# Patient Record
(demographics unavailable — no encounter records)

---

## 2025-02-14 NOTE — ASSESSMENT
[FreeTextEntry1] : 81 year old male PMHx DM2, HTN, DLD, BPH for follow up. He follows urology for his left flank pain.  - a1c 9.4% (compared to 9.1% in 11/23), - repeat A1c 9 - C/W Lantus 22 units at bedtime and metformin 1000 BID - Ophthalmology referral sent  - Podiatry referral sent   #HTN  - /82  - c/w losartan 25mg OD   # BPH  # hydrocele  - on flomax  #hand pain # shoulder pain, decreased ROM   - X-ray: Erosive, degenerative change in both hands consistent with underlying inflammatory arthropathy - RF negative - Likely OA pt worked as a  for many years - OTC Tylenol - Diclofenac ointment - Physical Therapy  #DLD ( )  - Lipitor 40 mg     labs work reviewed. Follow up in 1 month,.

## 2025-02-14 NOTE — HISTORY OF PRESENT ILLNESS
[FreeTextEntry1] : Follow up  [de-identified] : regular follow up; accompanied by his son who is providing translation as well.  81 year old male PMHx DM2, HTN for follow up. He follows urology for his BPH. Last visit his BP was normal and was advised to stop the medication. This visit BP is 178/82